# Patient Record
Sex: FEMALE | Race: WHITE | NOT HISPANIC OR LATINO
[De-identification: names, ages, dates, MRNs, and addresses within clinical notes are randomized per-mention and may not be internally consistent; named-entity substitution may affect disease eponyms.]

---

## 2023-12-11 PROBLEM — Z00.00 ENCOUNTER FOR PREVENTIVE HEALTH EXAMINATION: Status: ACTIVE | Noted: 2023-12-11

## 2023-12-20 ENCOUNTER — APPOINTMENT (OUTPATIENT)
Dept: ORTHOPEDIC SURGERY | Facility: CLINIC | Age: 63
End: 2023-12-20
Payer: MEDICARE

## 2023-12-20 VITALS
SYSTOLIC BLOOD PRESSURE: 122 MMHG | HEART RATE: 78 BPM | BODY MASS INDEX: 37.49 KG/M2 | HEIGHT: 65 IN | WEIGHT: 225 LBS | DIASTOLIC BLOOD PRESSURE: 78 MMHG

## 2023-12-20 DIAGNOSIS — M25.562 PAIN IN LEFT KNEE: ICD-10-CM

## 2023-12-20 PROCEDURE — 73564 X-RAY EXAM KNEE 4 OR MORE: CPT | Mod: LT

## 2023-12-20 PROCEDURE — 20610 DRAIN/INJ JOINT/BURSA W/O US: CPT | Mod: LT

## 2023-12-20 PROCEDURE — 99204 OFFICE O/P NEW MOD 45 MIN: CPT | Mod: 25

## 2023-12-20 NOTE — REVIEW OF SYSTEMS
[Joint Pain] : joint pain [Joint Stiffness] : joint stiffness [Joint Swelling] : joint swelling [Negative] : Heme/Lymph [FreeTextEntry9] : Left knee pain

## 2023-12-20 NOTE — PROCEDURE
[de-identified] : I injected his left knee. I discussed at length with the patient the planned steroid and lidocaine injection. The risks, benefits, convalescence and alternatives were reviewed. The possible side effects discussed included but were not limited to: pain, swelling, heat, bleeding, and redness. Symptoms are generally mild but if they are extensive then contact the office. Giving pain relievers by mouth such as NSAIDs or Tylenol can generally treat the reactions to steroid and lidocaine. Rare cases of infection have been noted. Rash, hives and itching may occur post injection. If you have muscle pain or cramps, flushing and or swelling of the face, rapid heart beat, nausea, dizziness, fever, chills, headache, difficulty breathing, swelling in the arms or legs, or have a prickly feeling of your skin, contact a health care provider immediately. Following this discussion, the knee was prepped with Alcohol and under sterile condition the 80 mg Depo-Medrol and 6 cc Lidocaine injection was performed with a 20 gauge needle through a superolateral injection site. The needle was introduced into the joint, aspiration was performed to ensure intra-articular placement and the medication was injected. Upon withdrawal of the needle the site was cleaned with alcohol and a band aid applied. The patient tolerated the injection well and there were no adverse effects. Post injection instructions included no strenuous activity for 24 hours, cryotherapy and if there are any adverse effects to contact the office.

## 2023-12-20 NOTE — DISCUSSION/SUMMARY
[de-identified] : Patient is a 63-year-old female with severe left knee osteoarthritis presenting today for initial evaluation.  We have thorough discussion about conservative surgical treatment options.  At this time she is not interested in pursuing any type of total knee arthroplasty.  I did discuss with her that even after a total knee arthroplasty the swelling in her leg is unlikely to resolve as she has had this for over 12 years.  She would like to try cortisone injection I gave her an injection of the left knee which she tolerated well.  We discussed low impact activity and exercise.  I recommended physical therapy.  She is to take Tylenol as well as meloxicam 15 mg daily as needed for pain.  I will see her back in 6 weeks for repeat evaluation possible initiation of viscosupplementation at that time.  All questions were asked and answered

## 2023-12-20 NOTE — PHYSICAL EXAM
[de-identified] : GENERAL APPEARANCE: Well nourished and hydrated, pleasant, alert, and oriented x 3. Appears their stated age.  HEENT: Normocephalic, extraocular eye motion intact. Nasal septum midline. Oral cavity clear. External auditory canal clear.  RESPIRATORY: Breath sounds clear and audible in all lobes. No wheezing, No accessory muscle use. CARDIOVASCULAR: No apparent abnormalities.  Diffuse edema of the left lower extremity. No varicosities. Pedal pulses are palpable. NEUROLOGIC: Sensation is normal, no muscle weakness in the upper or lower extremities. DERMATOLOGIC: No apparent skin lesions, moist, warm, no rash. SPINE: Cervical spine appears normal and moves freely; thoracic spine appears normal and moves freely; lumbosacral spine appears normal and moves freely, normal, nontender. MUSCULOSKELETAL: Hands, wrists, and elbows are normal and move freely, shoulders are normal and move freely.  Psychiatric: Oriented to person, place, and time, insight and judgement were intact and the affect was normal.  Musculoskeletal:. Left knee exam shows moderate effusion, ROM is 0-120 degrees, no instability, no pain with Hoda, medial and lateral joint line tenderness.  5/5 motor strength in bilateral lower extremities. Sensory: Intact in bilateral lower extremities. DTRs: Biceps, brachioradialis, triceps, patellar, ankle and plantar 2+ and symmetric bilaterally. Pulses: dorsalis pedis, posterior tibial, femoral, popliteal, and radial 2+ and symmetric bilaterally.  Constitutional: Alert and in no acute distress, but well-appearing.  [de-identified] : 4 views of the left knee obtained the office today show no acute fracture or dislocation.  There is severe medial joint space narrowing bone-on-bone osteoarthritis tricompartmental degenerative changes consistent with Kellgren-Nate grade 4 changes

## 2024-02-07 ENCOUNTER — APPOINTMENT (OUTPATIENT)
Dept: ORTHOPEDIC SURGERY | Facility: CLINIC | Age: 64
End: 2024-02-07

## 2024-05-26 ENCOUNTER — NON-APPOINTMENT (OUTPATIENT)
Age: 64
End: 2024-05-26

## 2024-05-29 ENCOUNTER — APPOINTMENT (OUTPATIENT)
Dept: ORTHOPEDIC SURGERY | Facility: CLINIC | Age: 64
End: 2024-05-29
Payer: MEDICARE

## 2024-05-29 VITALS
WEIGHT: 225 LBS | SYSTOLIC BLOOD PRESSURE: 152 MMHG | HEIGHT: 65 IN | BODY MASS INDEX: 37.49 KG/M2 | DIASTOLIC BLOOD PRESSURE: 83 MMHG

## 2024-05-29 DIAGNOSIS — M17.11 UNILATERAL PRIMARY OSTEOARTHRITIS, RIGHT KNEE: ICD-10-CM

## 2024-05-29 DIAGNOSIS — E66.9 OBESITY, UNSPECIFIED: ICD-10-CM

## 2024-05-29 DIAGNOSIS — M17.12 UNILATERAL PRIMARY OSTEOARTHRITIS, LEFT KNEE: ICD-10-CM

## 2024-05-29 PROCEDURE — 73564 X-RAY EXAM KNEE 4 OR MORE: CPT | Mod: 50

## 2024-05-29 PROCEDURE — 99214 OFFICE O/P EST MOD 30 MIN: CPT

## 2024-05-29 NOTE — PHYSICAL EXAM
[de-identified] : GENERAL APPEARANCE: Well nourished and hydrated, pleasant, alert, and oriented x 3. Appears their stated age.  HEENT: Normocephalic, extraocular eye motion intact. Nasal septum midline. Oral cavity clear. External auditory canal clear.  RESPIRATORY: Breath sounds clear and audible in all lobes. No wheezing, No accessory muscle use. CARDIOVASCULAR: No apparent abnormalities. No lower leg edema. No varicosities. Pedal pulses are palpable. NEUROLOGIC: Sensation is normal, no muscle weakness in the upper or lower extremities. DERMATOLOGIC: No apparent skin lesions, moist, warm, no rash. SPINE: Cervical spine appears normal and moves freely; thoracic spine appears normal and moves freely; lumbosacral spine appears normal and moves freely, normal, nontender. MUSCULOSKELETAL: Hands, wrists, and elbows are normal and move freely, shoulders are normal and move freely.  Musculoskeletal 5/5 motor strength in bilateral lower extremities. Sensory: Intact in bilateral lower extremities. DTRs: Biceps, brachioradialis, triceps, patellar, ankle and plantar 2+ and symmetric bilaterally. Pulses: dorsalis pedis, posterior tibial, femoral, popliteal, and radial 2+ and symmetric bilaterally.  Constitutional: Alert and in no acute distress, but well-appearing.   [de-identified] : Bilateral knee examination shows mild joint effusion left more than right she has mild ecchymosis in the anterior knee and shin area range of motion is preserved she is able to weight-bear and walk

## 2024-05-29 NOTE — END OF VISIT
[FreeTextEntry3] : I, Bryce Junior, acted solely as a scribe for Dr. Toby Tomlinson on 05/29/2024.  I personally performed the services described in the documentation, reviewed the documentation recorded by the scribe in my presence, and it accurately and completely records my words and actions.

## 2024-05-29 NOTE — DISCUSSION/SUMMARY
[Medication Risks Reviewed] : Medication risks reviewed [de-identified] : 63 y/o F pt presents bone on bone medial compartmental osteoarthritis of the left knee and mild to moderate medial compartmental osteoarthritis of the right knee. The nature of her condition and treatment options were discussed in detail. The pt is a potential candidate for a left TKA but is not a candidate for a right TKA. I discussed the surgery in detail. I discussed conservative treatment such as cortisone injections, HA injections, PT, anti-inflammatories, and low impact exercise.  She should continue to do low impact exercises.  The pt defers on injection. The pt does have evidence of hematoma which I reassured the pt will resolve over time. I sent a script for an MRI of the left knee to better understand the etiology of her pain and fully rule out full-thickness cartilage loss. The pt had a fall and I would like to rule out stress fractures. I referred the pt to Dr. Andre for pain on left base of thumb after fall. The pt will f/u after the MRI.

## 2024-05-29 NOTE — HISTORY OF PRESENT ILLNESS
[Pain Location] : pain [Worsening] : worsening [5] : a current pain level of 5/10 [Standing] : standing [Walking] : worsened by walking [Knee Flexion] : worsened with knee flexion [de-identified] : Patient is here for evaluation of bilateral knee pain. She was seen for left knee pain in December with Dr. Roper. She received cortisone injection which made the knee feel better however she had sustained a fall 2 weeks ago. She has significant pain in both knees.  The pain is getting better.  Describes pain as throbbing and sharp pain She was seen by Dr. Arevalo in the past.  She has been getting injections since 2016.  She states the injection helps her from 2 to 5-month. She complains left lower extremity circumference is bigger than her right. She does not have pain in the left lower extremity. She has seen by a vascular doctor but did not have confirmatory diagnoses as of yet in the past 15 years She complains of lateral knee is  numb

## 2024-05-31 ENCOUNTER — APPOINTMENT (OUTPATIENT)
Dept: ORTHOPEDIC SURGERY | Facility: CLINIC | Age: 64
End: 2024-05-31
Payer: MEDICARE

## 2024-05-31 VITALS
HEART RATE: 79 BPM | DIASTOLIC BLOOD PRESSURE: 84 MMHG | HEIGHT: 65 IN | BODY MASS INDEX: 37.49 KG/M2 | SYSTOLIC BLOOD PRESSURE: 151 MMHG | WEIGHT: 225 LBS

## 2024-05-31 PROCEDURE — 99215 OFFICE O/P EST HI 40 MIN: CPT

## 2024-05-31 PROCEDURE — 73110 X-RAY EXAM OF WRIST: CPT | Mod: LT

## 2024-05-31 RX ORDER — MELOXICAM 15 MG/1
15 TABLET ORAL DAILY
Qty: 15 | Refills: 1 | Status: ACTIVE | COMMUNITY
Start: 2024-05-31 | End: 1900-01-01

## 2024-05-31 NOTE — PHYSICAL EXAM
[de-identified] : Examination of the [left] wrist reveals tenderness at the level of the distal radius with swelling bruising and ecchymosis at the wrist  There is stiffness with digital motion otherwise there is an intact neurovascular examination. [de-identified] :  [3] views of [left] wrist were obtained today in my office and were seen by me and discussed with the patient. These [show findings consistent with a distal radius fracture with styloid displacement mild articular involvement.

## 2024-05-31 NOTE — HISTORY OF PRESENT ILLNESS
[FreeTextEntry1] : Norma is a pleasant 64-year-old female who presents today with an acute history of fall onto the left wrist on 5-11.  For this she saw Dr. Cardoza for initial follow-up and presents here for second opinion.  She describes persistent pain

## 2024-05-31 NOTE — PHYSICAL EXAM
[de-identified] : Examination of the [left] wrist reveals tenderness at the level of the distal radius with swelling bruising and ecchymosis at the wrist  There is stiffness with digital motion otherwise there is an intact neurovascular examination. [de-identified] :  [3] views of [left] wrist were obtained today in my office and were seen by me and discussed with the patient. These [show findings consistent with a distal radius fracture with styloid displacement mild articular involvement.

## 2024-05-31 NOTE — ASSESSMENT
[FreeTextEntry1] : ASSESSMENT: [The patient was accompanied today and was assisted with explaining their complaints today.] The patient comes in today with acute onset of left wrist pain subsequent to fall injury on 5-11.  For this she saw Dr. Arevalo and is now here for second opinion.  She does have complication of left distal radius fracture I do recommend proper splinting nonweightbearing modalities and gentle range of motion.  We have discussed anti-inflammatory medication which she elects.  We will continue to follow   The patient was adequately and thoroughly informed of my assessment of their current condition(s).  - This may diminish bodily function for the extremity. We discussed prognosis, tx modalities including operative and nonoperative options for the above diagnostic assessment. As always, 2nd opinion is always provided as an option.  When accessible, I was able to review other physicians note(s) including reviewing other tests, imaging results as well as personally view these results for my own interpretation.   The patient was adequately and thoroughly informed of my assessment of their current condition(s). A prescription for meloxicam was given today. The patient was instructed to take this with food and discontinue other NSAIDs while taking this. The risks, benefits and black box warnings were discussed. The patient was instructed to discontinue the medication if it began hurting her stomach.  DISCUSSION: 1.  Meloxicam as above.  Left distal radius fracture splint.  Nonweightbearing follow-up 1 week repeat x-rays of the left wrist 2. [x] 3. [x]

## 2024-06-07 ENCOUNTER — APPOINTMENT (OUTPATIENT)
Dept: ORTHOPEDIC SURGERY | Facility: CLINIC | Age: 64
End: 2024-06-07
Payer: MEDICARE

## 2024-06-07 ENCOUNTER — TRANSCRIPTION ENCOUNTER (OUTPATIENT)
Age: 64
End: 2024-06-07

## 2024-06-07 VITALS
HEIGHT: 65 IN | BODY MASS INDEX: 37.49 KG/M2 | WEIGHT: 225 LBS | SYSTOLIC BLOOD PRESSURE: 139 MMHG | HEART RATE: 73 BPM | DIASTOLIC BLOOD PRESSURE: 84 MMHG

## 2024-06-07 DIAGNOSIS — M25.649 STIFFNESS OF UNSPECIFIED HAND, NOT ELSEWHERE CLASSIFIED: ICD-10-CM

## 2024-06-07 DIAGNOSIS — M25.532 PAIN IN LEFT WRIST: ICD-10-CM

## 2024-06-07 DIAGNOSIS — S52.502A UNSPECIFIED FRACTURE OF THE LOWER END OF LEFT RADIUS, INITIAL ENCOUNTER FOR CLOSED FRACTURE: ICD-10-CM

## 2024-06-07 PROCEDURE — 73110 X-RAY EXAM OF WRIST: CPT | Mod: LT

## 2024-06-07 PROCEDURE — 99214 OFFICE O/P EST MOD 30 MIN: CPT

## 2024-06-07 NOTE — PHYSICAL EXAM
[de-identified] : Examination of the [left] wrist reveals tenderness at the level of the distal radius with swelling bruising and ecchymosis at the wrist  There is stiffness with digital motion otherwise there is an intact neurovascular examination. [de-identified] :  [3] views of [left] wrist were obtained today in my office and were seen by me and discussed with the patient. These [show findings consistent with a distal radius fracture with styloid displacement mild articular involvement.

## 2024-06-07 NOTE — ASSESSMENT
[FreeTextEntry1] : ASSESSMENT: [The patient was accompanied today and was assisted with explaining their complaints today.] The patient comes in today with acute onset of left wrist pain subsequent to fall injury on 5-11.  For this she saw Dr. Arevalo and is now here for second opinion.  She does have complication of left distal radius fracture I do recommend proper splinting nonweightbearing modalities and gentle range of motion.  We have discussed anti-inflammatory medication.  We will continue to follow Furthermore, we will commence occupational therapy to enhance her outcome   The patient was adequately and thoroughly informed of my assessment of their current condition(s).  - This may diminish bodily function for the extremity. We discussed prognosis, tx modalities including operative and nonoperative options for the above diagnostic assessment. As always, 2nd opinion is always provided as an option.  When accessible, I was able to review other physicians note(s) including reviewing other tests, imaging results as well as personally view these results for my own interpretation.   The patient was adequately and thoroughly informed of my assessment of their current condition(s). A prescription for meloxicam was given today. The patient was instructed to take this with food and discontinue other NSAIDs while taking this. The risks, benefits and black box warnings were discussed. The patient was instructed to discontinue the medication if it began hurting her stomach.  DISCUSSION: 1.  Meloxicam as needed.  Left distal radius fracture bracing.   2.  OT prescription provided.  Follow-up 3 weeks repeat x-rays of left wrist 3. [x]

## 2024-06-07 NOTE — HISTORY OF PRESENT ILLNESS
[FreeTextEntry1] : Norma is a pleasant 64-year-old female who presents today with an acute history of fall onto the left wrist on 5-11.  For this she saw Dr. Cardoza for initial follow-up and presents here for second opinion.  At this point in time she is wearing proper bracing and describes improvement.

## 2024-06-14 ENCOUNTER — NON-APPOINTMENT (OUTPATIENT)
Age: 64
End: 2024-06-14

## 2024-06-14 DIAGNOSIS — M16.11 UNILATERAL PRIMARY OSTEOARTHRITIS, RIGHT HIP: ICD-10-CM

## 2024-07-03 ENCOUNTER — APPOINTMENT (OUTPATIENT)
Dept: ORTHOPEDIC SURGERY | Facility: CLINIC | Age: 64
End: 2024-07-03
Payer: MEDICARE

## 2024-07-03 DIAGNOSIS — S52.502A UNSPECIFIED FRACTURE OF THE LOWER END OF LEFT RADIUS, INITIAL ENCOUNTER FOR CLOSED FRACTURE: ICD-10-CM

## 2024-07-03 DIAGNOSIS — M25.532 PAIN IN LEFT WRIST: ICD-10-CM

## 2024-07-03 PROCEDURE — 99214 OFFICE O/P EST MOD 30 MIN: CPT

## 2024-07-03 PROCEDURE — 73110 X-RAY EXAM OF WRIST: CPT | Mod: LT

## 2024-07-23 ENCOUNTER — APPOINTMENT (OUTPATIENT)
Dept: ORTHOPEDIC SURGERY | Facility: CLINIC | Age: 64
End: 2024-07-23
Payer: MEDICARE

## 2024-07-23 VITALS
DIASTOLIC BLOOD PRESSURE: 82 MMHG | HEIGHT: 65 IN | BODY MASS INDEX: 37.49 KG/M2 | SYSTOLIC BLOOD PRESSURE: 137 MMHG | WEIGHT: 225 LBS | HEART RATE: 75 BPM

## 2024-07-23 DIAGNOSIS — M17.11 UNILATERAL PRIMARY OSTEOARTHRITIS, RIGHT KNEE: ICD-10-CM

## 2024-07-23 DIAGNOSIS — M17.12 UNILATERAL PRIMARY OSTEOARTHRITIS, LEFT KNEE: ICD-10-CM

## 2024-07-23 PROCEDURE — 72170 X-RAY EXAM OF PELVIS: CPT

## 2024-07-23 PROCEDURE — 20610 DRAIN/INJ JOINT/BURSA W/O US: CPT | Mod: 50

## 2024-07-23 PROCEDURE — 99214 OFFICE O/P EST MOD 30 MIN: CPT | Mod: 25

## 2024-07-23 NOTE — DISCUSSION/SUMMARY
[de-identified] : Medication risks reviewed. 65 y/o F pt presents bone on bone medial compartmental osteoarthritis of the left knee and mild to moderate medial compartmental osteoarthritis of the right knee. The nature of her condition and treatment options were discussed in detail. The pt is a potential candidate for a left TKA but is not yet a candidate for a right TKA.  She is not yet interested in surgical treatment. I discussed conservative treatment such as cortisone injections, HA injections, PT, anti-inflammatories, and low impact exercise.  Patient had good relief with cortisone injection she opted in for bilateral knee cortisone injection in the knee .  Regards to her hip pain it is likely soft tissue related from antalgic gait or lumbar spine degeneration I reassured patient her hip joint is healthy has some minimal arthritic change and no pathologic lesions she should continue to do low impact exercises.  I refer patient to Dr. amador or simin to treat chronic lower back pain related to degenerative joint disease.  Will see her back in 3-month for repeat evaluation

## 2024-07-23 NOTE — REVIEW OF SYSTEMS
[Joint Pain] : joint pain [Negative] : Heme/Lymph [FreeTextEntry9] : Bilateral knee and bilateral hip

## 2024-07-23 NOTE — PROCEDURE
[de-identified] : Patient received bilateral knee 80mg cortisone injection for osteoarthritis  I discussed at length with the patient the planned steroid and lidocaine injection. The risks, benefits, convalescence and alternatives were reviewed. The possible side effects discussed included but were not limited to: pain, swelling, heat, bleeding, and redness. Symptoms are generally mild but if they are extensive then contact the office. Giving pain relievers by mouth such as NSAIDs or Tylenol can generally treat the reactions to steroid and lidocaine. Rare cases of infection have been noted. Rash, hives and itching may occur post injection. If you have muscle pain or cramps, flushing and or swelling of the face, rapid heart beat, nausea, dizziness, fever, chills, headache, difficulty breathing, swelling in the arms or legs, or have a prickly feeling of your skin, contact a health care provider immediately. Following this discussion, the knee was prepped with Alcohol and under sterile condition the 80 mg Depo-Medrol and 6 cc Lidocaine injection was performed with a 20 gauge needle through a superolateral injection site. The needle was introduced into the joint, aspiration was performed to ensure intra-articular placement and the medication was injected. Upon withdrawal of the needle the site was cleaned with alcohol and a band aid applied. The patient tolerated the injection well and there were no adverse effects. Post injection instructions included no strenuous activity for 24 hours, cryotherapy and if there are any adverse effects to contact the office.

## 2024-07-23 NOTE — PHYSICAL EXAM
[de-identified] : HEENT: Normocephalic, extraocular eye motion intact. Nasal septum midline. Oral cavity clear. External auditory canal clear. RESPIRATORY: Breath sounds clear and audible in all lobes. No wheezing, No accessory muscle use. CARDIOVASCULAR: No apparent abnormalities. No lower leg edema. No varicosities. Pedal pulses are palpable. NEUROLOGIC: Sensation is normal, no muscle weakness in the upper or lower extremities. DERMATOLOGIC: No apparent skin lesions, moist, warm, no rash. SPINE: Cervical spine appears normal and moves freely; thoracic spine appears normal and moves freely; lumbosacral spine appears normal and moves freely, normal, nontender. MUSCULOSKELETAL: Hands, wrists, and elbows are normal and move freely, shoulders are normal and move freely. Musculoskeletal 5/5 motor strength in bilateral lower extremities. Sensory: Intact in bilateral lower extremities. DTRs: Biceps, brachioradialis, triceps, patellar, ankle and plantar 2+ and symmetric bilaterally. Pulses: dorsalis pedis, posterior tibial, femoral, popliteal, and radial 2+ and symmetric bilaterally. Constitutional: Alert and in no acute distress, but well-appearing.   Musculoskeletal:. Bilateral knee examination shows mild joint effusion left more than right she has mild ecchymosis in the anterior knee and shin area range of motion is preserved she is able to weight-bear and walk.   [de-identified] : Bilateral hip examination shows adequate range of motion no groin pain with straight leg raise [de-identified] : 1 view pelvic x-ray shows adequate joint space in both hips no arthritic changes or pathologic lesions she has degenerative changes in lumbar spine

## 2024-07-23 NOTE — HISTORY OF PRESENT ILLNESS
[de-identified] : This is a 64-year-old with chronic bilateral knee pain that presented since 2016. in December 2023 he was seen by Dr. Roper he had cortisone injection in the left knee which made it so much better but patient sustained a fall in May she reports increased pain he did have left knee MRI which showed no meniscus tear but significant cartilage loss and patellofemoral and medial joint she complains of left knee swelling.  Her pain intensity in the left knee is 8 out of 10 right knee is 4 out of 10 She also complains of anterior bilateral hip pain and chronic lower back pain.  [Pain Location] : pain [Stable] : stable [___ yrs] : [unfilled] year(s) ago [8] : a current pain level of 8/10 [4] : a maximum pain level of 4/10 [Walking] : worsened by walking [Knee Flexion] : worsened with knee flexion

## 2024-08-09 ENCOUNTER — APPOINTMENT (OUTPATIENT)
Dept: ORTHOPEDIC SURGERY | Facility: CLINIC | Age: 64
End: 2024-08-09

## 2024-08-09 PROCEDURE — 73110 X-RAY EXAM OF WRIST: CPT | Mod: LT

## 2024-08-09 PROCEDURE — 99213 OFFICE O/P EST LOW 20 MIN: CPT

## 2024-08-09 NOTE — ASSESSMENT
[FreeTextEntry1] : ASSESSMENT: The patient comes in today with acute onset of left wrist pain subsequent to fall injury on 5-11.  For this she saw Dr. Arevalo and is now here for second opinion.  She does have complication of left distal radius fracture for which she has done well with proper immobilization occupational therapy and home exercise program.  She is delighted.   The patient was adequately and thoroughly informed of my assessment of their current condition(s).  - This may diminish bodily function for the extremity. We discussed prognosis, tx modalities including operative and nonoperative options for the above diagnostic assessment. As always, 2nd opinion is always provided as an option.  When accessible, I was able to review other physicians note(s) including reviewing other tests, imaging results as well as personally view these results for my own interpretation.   The patient was adequately and thoroughly informed of my assessment of their current condition(s).  DISCUSSION: 1.  X-rays and clinical exam reassuring.  We have discussed activity modification bracing as needed. 2. if f/u obtain xray left wrist

## 2024-08-09 NOTE — HISTORY OF PRESENT ILLNESS
[FreeTextEntry1] : Norma is a pleasant 64-year-old female who presents today with an acute history of fall onto the left wrist on 5-11.  For this she saw Dr. Cardoza for initial follow-up and presents here for second opinion.  At this point in time she has finalized with brace wear as well as occupational therapy.

## 2024-08-09 NOTE — PHYSICAL EXAM
[de-identified] : Examination of the [left] wrist reveals excellent range of motion at wrist and digits. [de-identified] :  [3] views of [left] wrist were obtained today in my office and were seen by me and discussed with the patient. These [show findings consistent with a distal radius fracture with styloid fracture which is healing beautifully.  Minimal step-off

## 2024-10-22 ENCOUNTER — APPOINTMENT (OUTPATIENT)
Dept: ORTHOPEDIC SURGERY | Facility: CLINIC | Age: 64
End: 2024-10-22
Payer: MEDICARE

## 2024-10-22 VITALS
DIASTOLIC BLOOD PRESSURE: 83 MMHG | SYSTOLIC BLOOD PRESSURE: 138 MMHG | WEIGHT: 225 LBS | BODY MASS INDEX: 37.49 KG/M2 | HEIGHT: 65 IN | HEART RATE: 77 BPM

## 2024-10-22 DIAGNOSIS — M17.12 UNILATERAL PRIMARY OSTEOARTHRITIS, LEFT KNEE: ICD-10-CM

## 2024-10-22 DIAGNOSIS — M17.11 UNILATERAL PRIMARY OSTEOARTHRITIS, RIGHT KNEE: ICD-10-CM

## 2024-10-22 PROCEDURE — 99212 OFFICE O/P EST SF 10 MIN: CPT | Mod: 25

## 2024-10-22 PROCEDURE — 20610 DRAIN/INJ JOINT/BURSA W/O US: CPT | Mod: LT

## 2024-11-08 ENCOUNTER — APPOINTMENT (OUTPATIENT)
Dept: ORTHOPEDIC SURGERY | Facility: CLINIC | Age: 64
End: 2024-11-08

## 2025-02-04 ENCOUNTER — APPOINTMENT (OUTPATIENT)
Dept: ORTHOPEDIC SURGERY | Facility: CLINIC | Age: 65
End: 2025-02-04
Payer: MEDICARE

## 2025-02-04 VITALS
SYSTOLIC BLOOD PRESSURE: 144 MMHG | DIASTOLIC BLOOD PRESSURE: 74 MMHG | HEIGHT: 65 IN | BODY MASS INDEX: 37.49 KG/M2 | WEIGHT: 225 LBS

## 2025-02-04 DIAGNOSIS — M17.11 UNILATERAL PRIMARY OSTEOARTHRITIS, RIGHT KNEE: ICD-10-CM

## 2025-02-04 DIAGNOSIS — M17.12 UNILATERAL PRIMARY OSTEOARTHRITIS, LEFT KNEE: ICD-10-CM

## 2025-02-04 DIAGNOSIS — E66.9 OBESITY, UNSPECIFIED: ICD-10-CM

## 2025-02-04 PROCEDURE — 20610 DRAIN/INJ JOINT/BURSA W/O US: CPT | Mod: LT

## 2025-02-04 PROCEDURE — 99214 OFFICE O/P EST MOD 30 MIN: CPT | Mod: 25

## 2025-05-06 ENCOUNTER — APPOINTMENT (OUTPATIENT)
Dept: ORTHOPEDIC SURGERY | Facility: CLINIC | Age: 65
End: 2025-05-06
Payer: MEDICARE

## 2025-05-06 VITALS
DIASTOLIC BLOOD PRESSURE: 80 MMHG | BODY MASS INDEX: 37.49 KG/M2 | WEIGHT: 225 LBS | HEIGHT: 65 IN | SYSTOLIC BLOOD PRESSURE: 132 MMHG

## 2025-05-06 DIAGNOSIS — M17.12 UNILATERAL PRIMARY OSTEOARTHRITIS, LEFT KNEE: ICD-10-CM

## 2025-05-06 PROCEDURE — 20610 DRAIN/INJ JOINT/BURSA W/O US: CPT | Mod: LT

## 2025-05-06 PROCEDURE — 99213 OFFICE O/P EST LOW 20 MIN: CPT | Mod: 25

## 2025-08-05 ENCOUNTER — APPOINTMENT (OUTPATIENT)
Dept: ORTHOPEDIC SURGERY | Facility: CLINIC | Age: 65
End: 2025-08-05

## 2025-08-05 VITALS
BODY MASS INDEX: 39.15 KG/M2 | HEIGHT: 65 IN | WEIGHT: 235 LBS | DIASTOLIC BLOOD PRESSURE: 77 MMHG | SYSTOLIC BLOOD PRESSURE: 134 MMHG

## 2025-08-05 DIAGNOSIS — M17.12 UNILATERAL PRIMARY OSTEOARTHRITIS, LEFT KNEE: ICD-10-CM

## 2025-08-05 DIAGNOSIS — M17.11 UNILATERAL PRIMARY OSTEOARTHRITIS, RIGHT KNEE: ICD-10-CM

## 2025-08-05 DIAGNOSIS — E66.9 OBESITY, UNSPECIFIED: ICD-10-CM

## 2025-08-05 PROCEDURE — 20610 DRAIN/INJ JOINT/BURSA W/O US: CPT | Mod: 50

## 2025-08-05 PROCEDURE — 99214 OFFICE O/P EST MOD 30 MIN: CPT | Mod: 25

## 2025-09-15 ENCOUNTER — APPOINTMENT (OUTPATIENT)
Dept: ORTHOPEDIC SURGERY | Facility: CLINIC | Age: 65
End: 2025-09-15
Payer: MEDICARE

## 2025-09-15 DIAGNOSIS — M17.11 UNILATERAL PRIMARY OSTEOARTHRITIS, RIGHT KNEE: ICD-10-CM

## 2025-09-15 DIAGNOSIS — M17.12 UNILATERAL PRIMARY OSTEOARTHRITIS, LEFT KNEE: ICD-10-CM

## 2025-09-15 PROCEDURE — 73564 X-RAY EXAM KNEE 4 OR MORE: CPT | Mod: RT

## 2025-09-15 PROCEDURE — 99214 OFFICE O/P EST MOD 30 MIN: CPT
